# Patient Record
Sex: MALE | Race: WHITE | URBAN - METROPOLITAN AREA
[De-identification: names, ages, dates, MRNs, and addresses within clinical notes are randomized per-mention and may not be internally consistent; named-entity substitution may affect disease eponyms.]

---

## 2017-09-05 ENCOUNTER — TRANSFERRED RECORDS (OUTPATIENT)
Dept: HEALTH INFORMATION MANAGEMENT | Facility: CLINIC | Age: 32
End: 2017-09-05

## 2021-01-20 ENCOUNTER — TRANSFERRED RECORDS (OUTPATIENT)
Dept: HEALTH INFORMATION MANAGEMENT | Facility: CLINIC | Age: 36
End: 2021-01-20

## 2021-02-19 ENCOUNTER — TRANSFERRED RECORDS (OUTPATIENT)
Dept: HEALTH INFORMATION MANAGEMENT | Facility: CLINIC | Age: 36
End: 2021-02-19

## 2021-03-26 ENCOUNTER — TELEPHONE (OUTPATIENT)
Dept: OTOLARYNGOLOGY | Facility: CLINIC | Age: 36
End: 2021-03-26

## 2021-03-26 NOTE — TELEPHONE ENCOUNTER
Called patient to confirm that we would be contacting him to set up an appointment, per his request.    All questions answered and concerns addressed.    Sharon Monsalve RN

## 2021-03-26 NOTE — TELEPHONE ENCOUNTER
FUTURE VISIT INFORMATION      FUTURE VISIT INFORMATION:    Date: 4/27/21    Time: 9:10 AM    Location: [facility]  REFERRAL INFORMATION:    Referring provider:  Italia Sims NP    Referring providers clinic:  [place]    Reason for visit/diagnosis: Cholesteatoma (2nd Opinion)    RECORDS REQUESTED FROM:       Clinic name Comments Records Status Imaging Status   [place] 2/19/21, 1/20/21 - ENT OV with Italia Sims NP  1/20/21 - AUD OV with Kalpana MontoyaMaged 3/31 Sent to Scan    [place] - Procedure 1/20/21 - Audiogram  11/16/18 - Audiogram  12/22/17 - Audiogram  1/13/17 - Audiogram  8/5/05 - Audiogram 3/31 Sent to Scan    [place] - Surgery 9/5/17 - OP Note RIGHT REVISION TYMPANOMASTOIDECTOMY INTACT CANAL WALL with Dr. Rizzo  12/26/96 - OP Note for TYMPANOMASTOIDECTOMY, OSSICULAR RECONSTRUCTION with Dr. Mcintosh  12/15/95 - OP Note for RIGHT TYMPANOPLASTY with Dr. Mcintosh  3/10/95 - OP Note for TYMPANOPLASTY WITH COMPLETE MASTOIDECTOMY with Dr. Mcintosh 3/31 Sent to Scan    [place] - Imaging 2/19/21 - CT Temporal Bone WO  1/20/21 - MRI Brain  1/30/19 - MRI Brain W/WO  8/25/17 - CT Head WO  8/25/17 - CT Head Neck W  8/25/17 - CT Posterior Fossa/Sella/Orbit WO 3/26 Sent to Scan 3/26 Req - In PACs                 * 3/26/21 12:47 PM Faxed req to [place] for additional records and Audiograms and such. - Kassi  * 3/31/21 2:32 PM Records received from [place] and sent to [facility] to be scanned into the chart. - Kassi  * 3/31/21 2:45 PM Faxed req to [place] for images to be pushed to [place] PACs. - Kassi

## 2021-04-14 DIAGNOSIS — Z01.10 ENCOUNTER FOR HEARING TEST: Primary | ICD-10-CM

## 2021-04-27 ENCOUNTER — PRE VISIT (OUTPATIENT)
Dept: OTOLARYNGOLOGY | Facility: CLINIC | Age: 36
End: 2021-04-27

## 2021-04-27 ENCOUNTER — OFFICE VISIT (OUTPATIENT)
Dept: OTOLARYNGOLOGY | Facility: CLINIC | Age: 36
End: 2021-04-27
Payer: COMMERCIAL

## 2021-04-27 ENCOUNTER — OFFICE VISIT (OUTPATIENT)
Dept: AUDIOLOGY | Facility: CLINIC | Age: 36
End: 2021-04-27
Payer: COMMERCIAL

## 2021-04-27 VITALS — BODY MASS INDEX: 25.16 KG/M2 | HEIGHT: 66 IN | WEIGHT: 156.53 LBS

## 2021-04-27 DIAGNOSIS — Z01.10 ENCOUNTER FOR HEARING TEST: ICD-10-CM

## 2021-04-27 DIAGNOSIS — H90.0 CONDUCTIVE HEARING LOSS, BILATERAL: Primary | ICD-10-CM

## 2021-04-27 DIAGNOSIS — H71.91 CHOLESTEATOMA, RIGHT: Primary | ICD-10-CM

## 2021-04-27 PROCEDURE — 92557 COMPREHENSIVE HEARING TEST: CPT | Performed by: AUDIOLOGIST

## 2021-04-27 PROCEDURE — 99204 OFFICE O/P NEW MOD 45 MIN: CPT | Mod: 25 | Performed by: OTOLARYNGOLOGY

## 2021-04-27 PROCEDURE — 92565 STENGER TEST PURE TONE: CPT | Performed by: AUDIOLOGIST

## 2021-04-27 PROCEDURE — 92504 EAR MICROSCOPY EXAMINATION: CPT | Mod: GC | Performed by: OTOLARYNGOLOGY

## 2021-04-27 SDOH — HEALTH STABILITY: MENTAL HEALTH: HOW MANY STANDARD DRINKS CONTAINING ALCOHOL DO YOU HAVE ON A TYPICAL DAY?: NOT ASKED

## 2021-04-27 SDOH — HEALTH STABILITY: MENTAL HEALTH: HOW OFTEN DO YOU HAVE 6 OR MORE DRINKS ON ONE OCCASION?: NOT ASKED

## 2021-04-27 SDOH — HEALTH STABILITY: MENTAL HEALTH: HOW OFTEN DO YOU HAVE A DRINK CONTAINING ALCOHOL?: NOT ASKED

## 2021-04-27 ASSESSMENT — MIFFLIN-ST. JEOR: SCORE: 1597.75

## 2021-04-27 ASSESSMENT — PAIN SCALES - GENERAL: PAINLEVEL: NO PAIN (0)

## 2021-04-27 NOTE — PROGRESS NOTES
Otologic History  Otologic diagnosis: Bilateral cholesteatomas  Previous treatment: Right combined middle cranial fossa canal wall up tympanomastoidectomy (9/5/2017, Dr. Riaz Rizzo), Tympanomastoidectomy with OCR (12/26/1996, Dr. Mcintosh), Right tympanoplasty (12/15/1995, Dr. Mcintosh),  Tympanomastoidectomy (3/10/1995, Dr. Mcintosh)    Joselo Devlin is seen in consultation from Italia Sims NP.  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 The patient has an extensive otologic history significant for bilateral cholesteatomas and multiple surgeries. In brief, the patient most recently has been following with Dr. Riaz Rizzo who performed a combined right middle fossa transmastoid excision of recidivistic cholesteatoma on 9/5/2017. He was noted to have a superior bony canal defect that was reconstructed and his ear canal was left patent. The patient most recently was evaluated by Dr. Rizzo on 2/19/2021, and reportedly repeat imaging demonstrated evidence of cholesteatoma recidivism involving the middle fossa. The recommended procedure was a revision right middle fossa, mastoidectomy, and ear canal overclosure. The patient presents today for a second opinion.    Today he denies otalgia, otorrhea. He reports that his hearing has been stable.    He reports a history of recurrent acute otitis media as a child but denies PE tube placement.    He denies a family history of otologic issues.    Progress notes and operative records from Dr. Riaz Rizzo were independently reviewed and are summarized below.    Operative report, 9/5/2017, Dr. Riaz Rizzo, [facility]: The patient underwent a right revision canal wall up tympanomastoidectomy with middle cranial fossa approach for recidivistic cholesteatoma that had invaded the superior portion of the ear canal and extended into the middle cranial fossa with dural involvement.    No past medical history on file.    No past surgical history on file.    No  "family history on file.    Social History     Tobacco Use     Smoking status: Not on file   Substance Use Topics     Alcohol use: Not on file     Drug use: Not on file       Patient Supplied Answers to Review of Systems  The remainder of the 10 point review of systems is otherwise negative.    Physical examination:  Ht 1.676 m (5' 6\")   Wt 71 kg (156 lb 8.4 oz)   BMI 25.26 kg/m      Constitutional:  In no acute distress, appears stated age  Eyes:  Extraocular movements intact, no spontaneous nystagmus  Respiratory:  No increased work of breathing, wheezing or stridor  Musculoskeletal:  Good upper extremity strength  Skin:  No rashes on the head and neck  Neurologic:  House Brackman 1/6 bilaterally, ambulating normally  Psychiatric:  Alert, normal affect, answering questions appropriately  Ears:  Auricles are normal bilaterally. Right postauricular incision c/d/i and extends anteriorly along the superior aspect of the ear and turns superiorly into the temporal area.    Otomicroscopic Exam:  Right Ear: Narrow ear canal with moderate cerumen, debrided with suction. There is a pliable area of the superior ear canal with what appears to be a cartilage graft. This likely represents the are of the ear canal that was reconstructed during his last surgery. There is no evidence of cholesteatoma. A cartilage graft is in place in the posterior quadrant of the TM. The anterior middle ear space appears aerated.    Left Ear: Ear canal with moderate cerumen, debrided with cerumen curette. A cartilage graft is in place in the posterior quadrant of the TM. The anterior middle ear space appears aerated.     Audiograms and imaging were independently reviewed and interpreted.    Audiogram: 4/27/2021     Right ear: Moderate upsloping to normal at 1kHz-2kHz downsloping to mild CHL.   Left ear: Normal sloping to mild CHL.   SRT right: 25 dB left: 15 dB   WR right: 100% at 60 dB left: 100% at 55 dB   Acoustic Reflexes: CNT  Tympanograms: " type CNS right, type CNS left     Audiogram: 12/22/2017   Right ear: Moderate upsloping to normal at 2kHz downsloping to mild at 4kHz-6kHz upsloping to normal CHL.   Left ear: Mild upsloping to normal at 500Hz-1.5kHz downsloping to mild CHL.   SRT right: 30 dB left: 15 dB   WR right: 100% at 70 dB left: 95% at 45 dB   Acoustic Reflexes: DNT  Tympanograms: type DNT right, type B left     Imaging:  CT Temporal Bone, 2/19/2021, [facility]    Radiologist's impression: Findings for recurrent cholesteatoma on the right.    MR Brain, 1/20/2021, [facility]    Radiologist's impression: Suspected progressive cholesteatoma right ear.    MR Brain, 1/30/2019, [facility]    Radiologist's impression: Unavailable during today's visit.    Impression: MRI from 2019 reveals a focal area of diffusion restriction in the area superior to the right ear canal. Comparison with MRI from 2021 reveals enlargement of this area of diffusion restriction. Review of CT from 2021 and comparison to preoperative CT from 2017 does not seem to reveal a significantly larger defect of the superior portion of the ear canal.      IMPRESSION AND PLAN:  1.  Right middle ear and mastoid cholesteatoma with extension into the middle cranial fossa s/p combined middle cranial fossa transmastoid approach with canal wall up tympanomastoidectomy now with concern for cholesteatoma recidivism.   2.  Bilateral conductive hearing loss    The patient has an extensive otologic history necessitating multiple surgeries in the right ear due to recidivistic cholesteatoma. His most recent surgery entailed a combined middle fossa and transmastoid approach for recidivistic cholesteatoma involving the superior portion of the ear canal as well as the middle fossa dura. Though the patient has remained asymptomatic since this surgery, serial surveillance imaging revealed growth of an area of diffusion restriction superior to the right ear canal. This is certainly concerning for  recidivistic cholesteatoma. The patient has most recently been receiving care at the [facility], and due to the concern for recidivistic cholesteatoma a revision procedure was recommended.     We discussed several management options including canal wall down tympanomastoidectomy as well as radical mastoidectomy with ear canal oversew. The patient was noted to have dural involvement during his most recent surgery and thus proceeding with a canal wall down tympanomastoidectomy may reveal a degree of dural exposure that would necessitate ear canal overclosure.      The risks and benefits were discussed.  The risks include but are not limited to:  Worsened hearing which may require further surgery, profound and irreversible hearing loss, dizziness, damage to the taste nerve, damage to the facial nerve, tympanic membrane perforation requiring further surgery and infection.  Postoperative restrictions were discussed.    The patient will continue to consider his options and will contact the clinic should he wish to proceed with surgical management. All questions were answered.     Thank you very much for the opportunity to participate in the care of your patient.      Carter Woodard MD  Neurotology Fellow  Department of Otolaryngology - Head and Neck Surgery  [facility]    Disha Perera MD  Otology & Neurotology  [facility]    I, Disha Perera MD, saw this patient with the resident/fellow and agree with the resident s findings and plan of care as documented in the resident s/fellow s note. I was present for the entire procedure.

## 2021-04-27 NOTE — NURSING NOTE
"Chief Complaint   Patient presents with     Consult     cholesteatoma      Height 1.676 m (5' 6\"), weight 71 kg (156 lb 8.4 oz).    Ernst Fragoso LPN    "

## 2021-04-27 NOTE — PATIENT INSTRUCTIONS
1. You were seen in the ENT Clinic today by Dr. Perera.  If you have any questions or concerns after your appointment, please call   - Option 1: ENT Clinic: [phone]   - Option 2: Sharon (Dr. Perera' Nurse): [phone]    2.   Plan to return to clinic as needed  Sharon Leyva LPN  [facility] - Otolaryngology

## 2021-04-27 NOTE — LETTER
4/27/2021       RE: Joselo Devlin  [address] [address]     Dear Colleague,    Thank you for referring your patient, Joselo Devlin, to the [facility] [place] at [facility]. Please see a copy of my visit note below.    Otologic History  Otologic diagnosis: Bilateral cholesteatomas  Previous treatment: Right combined middle cranial fossa canal wall up tympanomastoidectomy (9/5/2017, Dr. Riaz Rizzo), Tympanomastoidectomy with OCR (12/26/1996, Dr. Mcintosh), Right tympanoplasty (12/15/1995, Dr. Mcintosh),  Tympanomastoidectomy (3/10/1995, Dr. Mcintosh)    Joselo Devlin is seen in consultation from Italia Sims NP.  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 The patient has an extensive otologic history significant for bilateral cholesteatomas and multiple surgeries. In brief, the patient most recently has been following with Dr. Riaz Rizzo who performed a combined right middle fossa transmastoid excision of recidivistic cholesteatoma on 9/5/2017. He was noted to have a superior bony canal defect that was reconstructed and his ear canal was left patent. The patient most recently was evaluated by Dr. Rizzo on 2/19/2021, and reportedly repeat imaging demonstrated evidence of cholesteatoma recidivism involving the middle fossa. The recommended procedure was a revision right middle fossa, mastoidectomy, and ear canal overclosure. The patient presents today for a second opinion.    Today he denies otalgia, otorrhea. He reports that his hearing has been stable.    He reports a history of recurrent acute otitis media as a child but denies PE tube placement.    He denies a family history of otologic issues.    Progress notes and operative records from Dr. Riaz Rizzo were independently reviewed and are summarized below.    Operative report, 9/5/2017, Dr. Riaz Rizzo, [facility]:  "The patient underwent a right revision canal wall up tympanomastoidectomy with middle cranial fossa approach for recidivistic cholesteatoma that had invaded the superior portion of the ear canal and extended into the middle cranial fossa with dural involvement.    No past medical history on file.    No past surgical history on file.    No family history on file.    Social History     Tobacco Use     Smoking status: Not on file   Substance Use Topics     Alcohol use: Not on file     Drug use: Not on file       Patient Supplied Answers to Review of Systems  The remainder of the 10 point review of systems is otherwise negative.    Physical examination:  Ht 1.676 m (5' 6\")   Wt 71 kg (156 lb 8.4 oz)   BMI 25.26 kg/m      Constitutional:  In no acute distress, appears stated age  Eyes:  Extraocular movements intact, no spontaneous nystagmus  Respiratory:  No increased work of breathing, wheezing or stridor  Musculoskeletal:  Good upper extremity strength  Skin:  No rashes on the head and neck  Neurologic:  House Brackman 1/6 bilaterally, ambulating normally  Psychiatric:  Alert, normal affect, answering questions appropriately  Ears:  Auricles are normal bilaterally. Right postauricular incision c/d/i and extends anteriorly along the superior aspect of the ear and turns superiorly into the temporal area.    Otomicroscopic Exam:  Right Ear: Narrow ear canal with moderate cerumen, debrided with suction. There is a pliable area of the superior ear canal with what appears to be a cartilage graft. This likely represents the are of the ear canal that was reconstructed during his last surgery. There is no evidence of cholesteatoma. A cartilage graft is in place in the posterior quadrant of the TM. The anterior middle ear space appears aerated.    Left Ear: Ear canal with moderate cerumen, debrided with cerumen curette. A cartilage graft is in place in the posterior quadrant of the TM. The anterior middle ear space appears " aerated.     Audiograms and imaging were independently reviewed and interpreted.    Audiogram: 4/27/2021     Right ear: Moderate upsloping to normal at 1kHz-2kHz downsloping to mild CHL.   Left ear: Normal sloping to mild CHL.   SRT right: 25 dB left: 15 dB   WR right: 100% at 60 dB left: 100% at 55 dB   Acoustic Reflexes: CNT  Tympanograms: type CNS right, type CNS left     Audiogram: 12/22/2017   Right ear: Moderate upsloping to normal at 2kHz downsloping to mild at 4kHz-6kHz upsloping to normal CHL.   Left ear: Mild upsloping to normal at 500Hz-1.5kHz downsloping to mild CHL.   SRT right: 30 dB left: 15 dB   WR right: 100% at 70 dB left: 95% at 45 dB   Acoustic Reflexes: DNT  Tympanograms: type DNT right, type B left     Imaging:  CT Temporal Bone, 2/19/2021, [facility]    Radiologist's impression: Findings for recurrent cholesteatoma on the right.    MR Brain, 1/20/2021, [facility]    Radiologist's impression: Suspected progressive cholesteatoma right ear.    MR Brain, 1/30/2019, [facility]    Radiologist's impression: Unavailable during today's visit.    Impression: MRI from 2019 reveals a focal area of diffusion restriction in the area superior to the right ear canal. Comparison with MRI from 2021 reveals enlargement of this area of diffusion restriction. Review of CT from 2021 and comparison to preoperative CT from 2017 does not seem to reveal a significantly larger defect of the superior portion of the ear canal.      IMPRESSION AND PLAN:  1.  Right middle ear and mastoid cholesteatoma with extension into the middle cranial fossa s/p combined middle cranial fossa transmastoid approach with canal wall up tympanomastoidectomy now with concern for cholesteatoma recidivism.   2.  Bilateral conductive hearing loss    The patient has an extensive otologic history necessitating multiple surgeries in the right ear due to recidivistic cholesteatoma. His most recent surgery entailed a combined middle fossa and  transmastoid approach for recidivistic cholesteatoma involving the superior portion of the ear canal as well as the middle fossa dura. Though the patient has remained asymptomatic since this surgery, serial surveillance imaging revealed growth of an area of diffusion restriction superior to the right ear canal. This is certainly concerning for recidivistic cholesteatoma. The patient has most recently been receiving care at the [facility], and due to the concern for recidivistic cholesteatoma a revision procedure was recommended.     We discussed several management options including canal wall down tympanomastoidectomy as well as radical mastoidectomy with ear canal oversew. The patient was noted to have dural involvement during his most recent surgery and thus proceeding with a canal wall down tympanomastoidectomy may reveal a degree of dural exposure that would necessitate ear canal overclosure.      The risks and benefits were discussed.  The risks include but are not limited to:  Worsened hearing which may require further surgery, profound and irreversible hearing loss, dizziness, damage to the taste nerve, damage to the facial nerve, tympanic membrane perforation requiring further surgery and infection.  Postoperative restrictions were discussed.    The patient will continue to consider his options and will contact the clinic should he wish to proceed with surgical management. All questions were answered.     Thank you very much for the opportunity to participate in the care of your patient.      Carter Woodard MD  Neurotology Fellow  Department of Otolaryngology - Head and Neck Surgery  [facility]    Disha Perera MD  Otology & Neurotology  [facility]    I, Disha Perera MD, saw this patient with the resident/fellow and agree with the resident s findings and plan of care as documented in the resident s/fellow s note. I was present for the entire procedure.          Again,  thank you for allowing me to participate in the care of your patient.      Sincerely,    Disha Perera MD

## 2021-04-27 NOTE — LETTER
Date:May 26, 2021      Patient was self referred, no letter generated. Do not send.        [facility] Health Information

## 2021-04-28 NOTE — PROGRESS NOTES
AUDIOLOGY REPORT    SUMMARY: Audiology visit completed. See audiogram for results.      RECOMMENDATIONS: Follow-up with ENT.    Ember Vu CCC-A  Licensed Audiologist   [place] #[id number]